# Patient Record
Sex: FEMALE | Race: BLACK OR AFRICAN AMERICAN | NOT HISPANIC OR LATINO | Employment: OTHER | URBAN - METROPOLITAN AREA
[De-identification: names, ages, dates, MRNs, and addresses within clinical notes are randomized per-mention and may not be internally consistent; named-entity substitution may affect disease eponyms.]

---

## 2019-02-20 ENCOUNTER — TELEPHONE (OUTPATIENT)
Dept: ADMINISTRATIVE | Facility: HOSPITAL | Age: 59
End: 2019-02-20

## 2019-02-20 NOTE — TELEPHONE ENCOUNTER
"Informed pt were are making effort to return calls and to answer "[facility] are now contacting 95%of the pt on the same day that they reach out to us. We may be able to help you and if you feel the need to be seen. Pt voiced understanding.  "

## 2021-09-22 ENCOUNTER — HOSPITAL ENCOUNTER (OUTPATIENT)
Dept: RADIOLOGY | Facility: HOSPITAL | Age: 61
Discharge: HOME | End: 2021-09-22
Payer: MEDICARE

## 2021-09-22 DIAGNOSIS — T14.8XXA SPRAIN: Primary | ICD-10-CM

## 2021-09-22 DIAGNOSIS — M43.22 FUSION OF SPINE, CERVICAL REGION: Primary | ICD-10-CM

## 2021-09-22 DIAGNOSIS — M43.22 FUSION OF SPINE, CERVICAL REGION: ICD-10-CM

## 2021-09-22 PROCEDURE — 72040 X-RAY EXAM NECK SPINE 2-3 VW: CPT | Mod: TC

## 2021-12-30 ENCOUNTER — LAB VISIT (OUTPATIENT)
Dept: PRIMARY CARE CLINIC | Facility: OTHER | Age: 61
End: 2021-12-30
Attending: INTERNAL MEDICINE
Payer: MEDICARE

## 2021-12-30 DIAGNOSIS — Z20.822 ENCOUNTER FOR LABORATORY TESTING FOR COVID-19 VIRUS: Primary | ICD-10-CM

## 2021-12-30 PROCEDURE — U0003 INFECTIOUS AGENT DETECTION BY NUCLEIC ACID (DNA OR RNA); SEVERE ACUTE RESPIRATORY SYNDROME CORONAVIRUS 2 (SARS-COV-2) (CORONAVIRUS DISEASE [COVID-19]), AMPLIFIED PROBE TECHNIQUE, MAKING USE OF HIGH THROUGHPUT TECHNOLOGIES AS DESCRIBED BY CMS-2020-01-R: HCPCS | Mod: ST72 | Performed by: INTERNAL MEDICINE

## 2022-01-03 ENCOUNTER — PATIENT MESSAGE (OUTPATIENT)
Dept: ADMINISTRATIVE | Facility: OTHER | Age: 62
End: 2022-01-03
Payer: MEDICARE

## 2022-01-05 LAB
SARS-COV-2 RNA RESP QL NAA+PROBE: NOT DETECTED
SARS-COV-2- CYCLE NUMBER: NORMAL

## 2022-06-24 DIAGNOSIS — U07.1 COVID-19 VIRUS DETECTED: ICD-10-CM

## 2022-06-27 ENCOUNTER — NURSE TRIAGE (OUTPATIENT)
Dept: ADMINISTRATIVE | Facility: CLINIC | Age: 62
End: 2022-06-27
Payer: MEDICARE

## 2022-06-27 NOTE — TELEPHONE ENCOUNTER
[facility] pt was called back twice per protocol.  No answer.  Unable to leave .     Reason for Disposition   Unable to complete triage due to phone connection issues    Additional Information   Negative: Caller has already spoken with the PCP (or office), and has no further questions   Negative: Caller has already spoken with another triager and has no further questions   Negative: Caller has already spoken with another triager or PCP (or office), and has further questions and triager able to answer questions.   Negative: Busy signal.  First attempt to contact caller.  Follow-up call scheduled within 15 minutes.   Negative: No answer.  First attempt to contact caller.  Follow-up call scheduled within 15 minutes.   Negative: Message left on identified voicemail   Negative: Message left on unidentified voice mail. Phone number verified.   Negative: Message left with person in household   Negative: Wrong number reached. Phone number verified.   Negative: Second attempt to contact caller AND no contact made. Phone number verified.   Negative: Third attempt to contact caller AND no contact made. Phone number verified.   Negative: Cell phone out of range. Phone number verified.   Negative: Patient already left for the hospital/clinic   Negative: Caller has cancelled the call before the first contact    Protocols used: NO CONTACT OR DUPLICATE CONTACT CALL-A-[place]

## 2023-02-06 PROBLEM — Z88.8 ALLERGY TO STATIN MEDICATION: Chronic | Status: ACTIVE | Noted: 2023-02-06

## 2023-02-06 PROBLEM — Z78.9 STATIN INTOLERANCE: Status: ACTIVE | Noted: 2023-02-06

## 2023-09-05 PROBLEM — R42 DYSEQUILIBRIUM: Status: ACTIVE | Noted: 2023-09-05

## 2023-09-05 PROBLEM — Z74.09 IMPAIRED FUNCTIONAL MOBILITY, BALANCE, AND ENDURANCE: Status: ACTIVE | Noted: 2023-09-05

## 2023-11-12 PROBLEM — I21.4 NSTEMI (NON-ST ELEVATED MYOCARDIAL INFARCTION): Status: ACTIVE | Noted: 2023-11-12

## 2023-11-12 PROBLEM — E78.5 HLD (HYPERLIPIDEMIA): Status: ACTIVE | Noted: 2023-11-12

## 2023-11-12 PROBLEM — I10 HTN (HYPERTENSION): Status: ACTIVE | Noted: 2023-11-12

## 2023-12-01 PROBLEM — M54.42 CHRONIC BILATERAL LOW BACK PAIN WITH BILATERAL SCIATICA: Status: ACTIVE | Noted: 2023-12-01

## 2023-12-01 PROBLEM — M54.41 CHRONIC BILATERAL LOW BACK PAIN WITH BILATERAL SCIATICA: Status: ACTIVE | Noted: 2023-12-01

## 2023-12-01 PROBLEM — G89.29 CHRONIC BILATERAL LOW BACK PAIN WITH BILATERAL SCIATICA: Status: ACTIVE | Noted: 2023-12-01

## 2023-12-18 ENCOUNTER — PATIENT MESSAGE (OUTPATIENT)
Dept: ADMINISTRATIVE | Facility: HOSPITAL | Age: 63
End: 2023-12-18
Payer: MEDICARE

## 2024-02-12 PROBLEM — I21.4 NSTEMI (NON-ST ELEVATED MYOCARDIAL INFARCTION): Status: RESOLVED | Noted: 2023-11-12 | Resolved: 2024-02-12

## 2024-04-24 ENCOUNTER — PATIENT OUTREACH (OUTPATIENT)
Dept: ADMINISTRATIVE | Facility: HOSPITAL | Age: 64
End: 2024-04-24
Payer: MEDICARE

## 2024-04-24 DIAGNOSIS — Z78.0 MENOPAUSE: ICD-10-CM

## 2024-08-14 DIAGNOSIS — R73.03 PREDIABETES: ICD-10-CM
